# Patient Record
Sex: MALE | HISPANIC OR LATINO
[De-identification: names, ages, dates, MRNs, and addresses within clinical notes are randomized per-mention and may not be internally consistent; named-entity substitution may affect disease eponyms.]

---

## 2024-04-15 ENCOUNTER — APPOINTMENT (OUTPATIENT)
Age: 34
End: 2024-04-15
Payer: SELF-PAY

## 2024-04-15 VITALS
HEIGHT: 65.75 IN | WEIGHT: 175 LBS | OXYGEN SATURATION: 98 % | BODY MASS INDEX: 28.46 KG/M2 | DIASTOLIC BLOOD PRESSURE: 85 MMHG | HEART RATE: 85 BPM | SYSTOLIC BLOOD PRESSURE: 139 MMHG

## 2024-04-15 DIAGNOSIS — S69.91XA UNSPECIFIED INJURY OF RIGHT WRIST, HAND AND FINGER(S), INITIAL ENCOUNTER: ICD-10-CM

## 2024-04-15 PROBLEM — Z00.00 ENCOUNTER FOR PREVENTIVE HEALTH EXAMINATION: Status: ACTIVE | Noted: 2024-04-15

## 2024-04-15 PROCEDURE — 99214 OFFICE O/P EST MOD 30 MIN: CPT

## 2024-04-15 NOTE — REVIEW OF SYSTEMS
[Negative] : Respiratory [Fever] : no fever [Chills] : no chills [Fatigue] : no fatigue [Chest Pain] : no chest pain [Palpitations] : no palpitations [Claudication] : no  leg claudication [Shortness Of Breath] : no shortness of breath [Wheezing] : no wheezing [Cough] : no cough [FreeTextEntry1] : nail off thumb finger

## 2024-04-15 NOTE — ASSESSMENT
[FreeTextEntry1] : Right thumb fingernail torsion- nail completely gone Wound cleaned with water and betadine  Bacitracin applied with clean gauze   Patient asked to change bandage (gave supplies) at home 1-2x at home Wash hand normally with soap when taking off gauze  RTC in 2 days for check up  spent more than 30min taking care of pt's woundcare

## 2024-04-15 NOTE — PHYSICAL EXAM
[No Acute Distress] : no acute distress [Well Nourished] : well nourished [Well Developed] : well developed [No Respiratory Distress] : no respiratory distress  [No Accessory Muscle Use] : no accessory muscle use [Clear to Auscultation] : lungs were clear to auscultation bilaterally [Normal Rate] : normal rate  [Regular Rhythm] : with a regular rhythm [Normal S1, S2] : normal S1 and S2 [No Joint Swelling] : no joint swelling [Grossly Normal Strength/Tone] : grossly normal strength/tone [Normal Gait] : normal gait [Deep Tendon Reflexes (DTR)] : deep tendon reflexes were 2+ and symmetric [Normal] : normal gait, coordination grossly intact, no focal deficits and deep tendon reflexes were 2+ and symmetric [de-identified] : complete torsion of nailbed of thumb of right finger- mild bleeding noted

## 2024-04-15 NOTE — HISTORY OF PRESENT ILLNESS
[FreeTextEntry8] : 33yo M with no PMH- comes in stating an hour ago- he was holding the horse by his saddle- when suddenly the horse pulled and somehow his nail was attached to the saddle string and completely got toured off his finger.   Pt states that no other part of his hands/arm/body hurts him  States that had the tetnus shot 4years ago.

## 2024-04-16 ENCOUNTER — OUTPATIENT (OUTPATIENT)
Dept: OUTPATIENT SERVICES | Facility: HOSPITAL | Age: 34
LOS: 1 days | End: 2024-04-16

## 2024-04-17 ENCOUNTER — APPOINTMENT (OUTPATIENT)
Age: 34
End: 2024-04-17
Payer: SELF-PAY

## 2024-04-17 VITALS
WEIGHT: 175 LBS | OXYGEN SATURATION: 98 % | SYSTOLIC BLOOD PRESSURE: 141 MMHG | HEART RATE: 70 BPM | DIASTOLIC BLOOD PRESSURE: 70 MMHG | RESPIRATION RATE: 14 BRPM | BODY MASS INDEX: 28.46 KG/M2

## 2024-04-17 DIAGNOSIS — S69.90XS UNSPECIFIED INJURY OF UNSPECIFIED WRIST, HAND AND FINGER(S), SEQUELA: ICD-10-CM

## 2024-04-17 PROCEDURE — 99212 OFFICE O/P EST SF 10 MIN: CPT

## 2024-04-17 NOTE — HISTORY OF PRESENT ILLNESS
[FreeTextEntry1] : f/u [de-identified] : 35yo with no PMH- here for f/u of right thumb finger nail injury. Patient is doing well, denies pain, fevers, chills. Has been applying bacitracin topically and changes his own wound dressing daily.

## 2024-04-17 NOTE — REVIEW OF SYSTEMS
[Fever] : no fever [Chills] : no chills [Fatigue] : no fatigue [Chest Pain] : no chest pain [Palpitations] : no palpitations [Claudication] : no  leg claudication [Shortness Of Breath] : no shortness of breath [Wheezing] : no wheezing [Cough] : no cough [Negative] : Respiratory [FreeTextEntry1] : nail off thumb finger

## 2024-04-17 NOTE — PHYSICAL EXAM
[No Acute Distress] : no acute distress [Well Nourished] : well nourished [Well Developed] : well developed [No Respiratory Distress] : no respiratory distress  [No Accessory Muscle Use] : no accessory muscle use [Clear to Auscultation] : lungs were clear to auscultation bilaterally [Normal Rate] : normal rate  [Regular Rhythm] : with a regular rhythm [Normal S1, S2] : normal S1 and S2 [No Joint Swelling] : no joint swelling [Grossly Normal Strength/Tone] : grossly normal strength/tone [Normal Gait] : normal gait [Deep Tendon Reflexes (DTR)] : deep tendon reflexes were 2+ and symmetric [Normal] : normal gait, coordination grossly intact, no focal deficits and deep tendon reflexes were 2+ and symmetric [de-identified] : complete torsion of nailbed of thumb of right finger- no signs of infection seen. Raw finger without a nail

## 2024-04-17 NOTE — PLAN
[FreeTextEntry1] : Torsion of right thumb fingernail-  no signs of infection seen continue wound care with cleaning nail, adding bacitracin 2x a day and changing clean gauze  pt educated that it will take months for fingernail to regrow  RTC for an annual physical

## 2024-05-01 ENCOUNTER — APPOINTMENT (OUTPATIENT)
Age: 34
End: 2024-05-01

## 2024-12-27 ENCOUNTER — OUTPATIENT (OUTPATIENT)
Dept: OUTPATIENT SERVICES | Facility: HOSPITAL | Age: 34
LOS: 1 days | End: 2024-12-27

## 2024-12-27 ENCOUNTER — APPOINTMENT (OUTPATIENT)
Age: 34
End: 2024-12-27
Payer: SELF-PAY

## 2024-12-27 VITALS
BODY MASS INDEX: 27 KG/M2 | DIASTOLIC BLOOD PRESSURE: 79 MMHG | RESPIRATION RATE: 15 BRPM | WEIGHT: 166 LBS | HEART RATE: 84 BPM | TEMPERATURE: 98.7 F | SYSTOLIC BLOOD PRESSURE: 137 MMHG | OXYGEN SATURATION: 98 %

## 2024-12-27 DIAGNOSIS — H92.02 OTALGIA, LEFT EAR: ICD-10-CM

## 2024-12-27 DIAGNOSIS — Z23 ENCOUNTER FOR IMMUNIZATION: ICD-10-CM

## 2024-12-27 PROCEDURE — 99214 OFFICE O/P EST MOD 30 MIN: CPT

## 2024-12-31 DIAGNOSIS — Z23 ENCOUNTER FOR IMMUNIZATION: ICD-10-CM

## 2024-12-31 DIAGNOSIS — H92.02 OTALGIA, LEFT EAR: ICD-10-CM

## 2025-02-25 ENCOUNTER — APPOINTMENT (OUTPATIENT)
Age: 35
End: 2025-02-25

## 2025-02-25 ENCOUNTER — OUTPATIENT (OUTPATIENT)
Dept: OUTPATIENT SERVICES | Facility: HOSPITAL | Age: 35
LOS: 1 days | End: 2025-02-25

## 2025-02-25 VITALS
RESPIRATION RATE: 15 BRPM | HEART RATE: 109 BPM | DIASTOLIC BLOOD PRESSURE: 78 MMHG | TEMPERATURE: 97.3 F | OXYGEN SATURATION: 97 % | SYSTOLIC BLOOD PRESSURE: 122 MMHG

## 2025-02-25 DIAGNOSIS — H91.92 UNSPECIFIED HEARING LOSS, LEFT EAR: ICD-10-CM

## 2025-02-25 DIAGNOSIS — G89.29 OTALGIA, LEFT EAR: ICD-10-CM

## 2025-02-25 DIAGNOSIS — H92.02 OTALGIA, LEFT EAR: ICD-10-CM

## 2025-02-25 PROCEDURE — 99213 OFFICE O/P EST LOW 20 MIN: CPT | Mod: 95

## 2025-02-27 PROBLEM — H91.92 DECREASED HEARING OF LEFT EAR: Status: ACTIVE | Noted: 2025-02-27

## 2025-02-27 PROBLEM — H92.02 CHRONIC LEFT EAR PAIN: Status: ACTIVE | Noted: 2025-02-27

## 2025-03-04 DIAGNOSIS — H91.92 UNSPECIFIED HEARING LOSS, LEFT EAR: ICD-10-CM

## 2025-03-04 DIAGNOSIS — H92.02 OTALGIA, LEFT EAR: ICD-10-CM
